# Patient Record
Sex: MALE | Race: BLACK OR AFRICAN AMERICAN | NOT HISPANIC OR LATINO
[De-identification: names, ages, dates, MRNs, and addresses within clinical notes are randomized per-mention and may not be internally consistent; named-entity substitution may affect disease eponyms.]

---

## 2020-06-24 ENCOUNTER — DASHBOARD ENCOUNTERS (OUTPATIENT)
Age: 30
End: 2020-06-24

## 2020-06-24 ENCOUNTER — OFFICE VISIT (OUTPATIENT)
Dept: URBAN - METROPOLITAN AREA CLINIC 118 | Facility: CLINIC | Age: 30
End: 2020-06-24
Payer: OTHER GOVERNMENT

## 2020-06-24 DIAGNOSIS — K21.9 GERD: ICD-10-CM

## 2020-06-24 DIAGNOSIS — K52.9 COLITIS: ICD-10-CM

## 2020-06-24 PROBLEM — 64226004 COLITIS: Status: ACTIVE | Noted: 2020-06-24

## 2020-06-24 PROCEDURE — 99213 OFFICE O/P EST LOW 20 MIN: CPT | Performed by: INTERNAL MEDICINE

## 2020-06-24 PROCEDURE — G8427 DOCREV CUR MEDS BY ELIG CLIN: HCPCS | Performed by: INTERNAL MEDICINE

## 2020-06-24 PROCEDURE — G8420 CALC BMI NORM PARAMETERS: HCPCS | Performed by: INTERNAL MEDICINE

## 2020-06-24 PROCEDURE — 1036F TOBACCO NON-USER: CPT | Performed by: INTERNAL MEDICINE

## 2020-06-24 NOTE — HPI-TODAY'S VISIT:
The patient presents for f/u appt; he was admitted to the hospital 6 months ago for abd pain/diarrhea found to have colitis on ct scan.  sx's resolved with abx and has had no further lower gi sx's since that time.  reflux/nausea sx's have improved with ppi.  weight stable, denies abd pain or gi bleeding.

## 2021-06-25 ENCOUNTER — OFFICE VISIT (OUTPATIENT)
Dept: URBAN - METROPOLITAN AREA CLINIC 118 | Facility: CLINIC | Age: 31
End: 2021-06-25